# Patient Record
Sex: FEMALE | Race: BLACK OR AFRICAN AMERICAN | NOT HISPANIC OR LATINO | URBAN - METROPOLITAN AREA
[De-identification: names, ages, dates, MRNs, and addresses within clinical notes are randomized per-mention and may not be internally consistent; named-entity substitution may affect disease eponyms.]

---

## 2021-11-21 ENCOUNTER — EMERGENCY (EMERGENCY)
Facility: HOSPITAL | Age: 66
LOS: 1 days | Discharge: HOME | End: 2021-11-21
Admitting: EMERGENCY MEDICINE
Payer: MEDICARE

## 2021-11-21 VITALS
DIASTOLIC BLOOD PRESSURE: 84 MMHG | OXYGEN SATURATION: 96 % | RESPIRATION RATE: 18 BRPM | WEIGHT: 293 LBS | SYSTOLIC BLOOD PRESSURE: 150 MMHG | HEART RATE: 73 BPM | TEMPERATURE: 98 F | HEIGHT: 67 IN

## 2021-11-21 VITALS
OXYGEN SATURATION: 97 % | DIASTOLIC BLOOD PRESSURE: 81 MMHG | RESPIRATION RATE: 18 BRPM | SYSTOLIC BLOOD PRESSURE: 148 MMHG | HEART RATE: 76 BPM | TEMPERATURE: 98 F

## 2021-11-21 DIAGNOSIS — G89.29 OTHER CHRONIC PAIN: ICD-10-CM
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
## 2021-11-21 DIAGNOSIS — M54.50 LOW BACK PAIN, UNSPECIFIED: ICD-10-CM
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
## 2021-11-21 DIAGNOSIS — M25.562 PAIN IN LEFT KNEE: ICD-10-CM

## 2021-11-21 PROCEDURE — 99284 EMERGENCY DEPT VISIT MOD MDM: CPT

## 2021-11-21 RX ORDER — ACETAMINOPHEN 500 MG
975 TABLET ORAL ONCE
Refills: 0 | Status: COMPLETED | OUTPATIENT
Start: 2021-11-21 | End: 2021-11-21

## 2021-11-21 RX ADMIN — Medication 975 MILLIGRAM(S): at 16:52

## 2021-11-21 RX ADMIN — Medication 975 MILLIGRAM(S): at 15:52

## 2021-11-21 NOTE — ED PROVIDER NOTE - PATIENT PORTAL LINK FT
You can access the FollowMyHealth Patient Portal offered by [facility] by registering at the following website: [email or web address]. By joining [employer]’s FollowMyHealth portal, you will also be able to view your health information using other applications (apps) compatible with our system.

## 2021-11-21 NOTE — ED ADULT TRIAGE NOTE - CHIEF COMPLAINT QUOTE
Pt BIBA with c/o lower back pain. Pt with hx chronic back pain states she was out shopping and her back gave out. No numbness or loss of continence. Also c/o non traumatic Lt knee pain.

## 2021-11-21 NOTE — ED ADULT NURSE NOTE - NSFALLRSKPASTHIST_ED_ALL_ED
This is a recent snapshot of the patient's [facility] medical record.  For current drug dose and complete information and questions, call [phone] or In Basket pool, [facility]v home infusion ([id number])  CSN Number:  [id number]       no

## 2021-11-21 NOTE — ED PROVIDER NOTE - CARE PROVIDER_API CALL
Gertrudis Shukla)  [facility]  [address] [place] [address], [address]  [place] [address]  Phone: [phone]  Fax: [phone]  Follow Up Time:

## 2021-11-21 NOTE — ED PROVIDER NOTE - CLINICAL SUMMARY MEDICAL DECISION MAKING FREE TEXT BOX
Patient with chronic back pain and knee pain BIB EMS with worsening of pain after standing for prolonged period of time waiting for her access ride. given tylenol in ED. advised follow up with PCP and PM&R

## 2021-11-21 NOTE — ED BEHAVIORAL HEALTH NOTE - BEHAVIORAL HEALTH NOTE
SW consulted to the Ed by Provider to arrange transportation for PT.  PT is aware of being ineligible for transportation through medicaid.  [facility] at [phone] and arranged transportation for discharged.  P/U is scheduled for 7:35pm confirmation # [id number].  [facility] informed ED SW that they will call the  with the name of the cab services who will be picking PT up.  Team made aware and SW made available for further information.

## 2021-11-21 NOTE — ED PROVIDER NOTE - OBJECTIVE STATEMENT
PMHX back and L knee pain ambulates with cane presenst with back and knee pain. states that pain had been getting worse over the past few weeks. usually take topical diclofenac but has been taking ibuprofen for pain. states that she had gone shopping to day and been waiting for her accessor ride to return home. states that she had no where to sit and had stand for prolonged time which worsened her pain. states that she could not stand any longer to wait for her ride and called 911. denies fall or direct trauma

## 2022-03-04 NOTE — ED ADULT NURSE NOTE - NURSING NEURO ORIENTATION
Pt informed of result. We don't know if this is from ibuprofen or Crohn's disease. Redo in 5 yr.
oriented to person, place and time

## 2022-08-12 ENCOUNTER — EMERGENCY (EMERGENCY)
Facility: HOSPITAL | Age: 67
LOS: 1 days | Discharge: HOME | End: 2022-08-12
Attending: EMERGENCY MEDICINE | Admitting: EMERGENCY MEDICINE

## 2022-08-12 VITALS
OXYGEN SATURATION: 98 % | RESPIRATION RATE: 18 BRPM | HEART RATE: 80 BPM | DIASTOLIC BLOOD PRESSURE: 78 MMHG | SYSTOLIC BLOOD PRESSURE: 110 MMHG

## 2022-08-12 VITALS
HEIGHT: 67 IN | RESPIRATION RATE: 18 BRPM | HEART RATE: 77 BPM | SYSTOLIC BLOOD PRESSURE: 95 MMHG | OXYGEN SATURATION: 99 % | WEIGHT: 293 LBS | TEMPERATURE: 98 F | DIASTOLIC BLOOD PRESSURE: 67 MMHG

## 2022-08-12 DIAGNOSIS — E11.9 TYPE 2 DIABETES MELLITUS WITHOUT COMPLICATIONS: ICD-10-CM

## 2022-08-12 DIAGNOSIS — R20.0 ANESTHESIA OF SKIN: ICD-10-CM

## 2022-08-12 DIAGNOSIS — I10 ESSENTIAL (PRIMARY) HYPERTENSION: ICD-10-CM

## 2022-08-12 DIAGNOSIS — M54.42 LUMBAGO WITH SCIATICA, LEFT SIDE: ICD-10-CM

## 2022-08-12 DIAGNOSIS — M46.96 UNSPECIFIED INFLAMMATORY SPONDYLOPATHY, LUMBAR REGION: ICD-10-CM

## 2022-08-12 PROCEDURE — 99284 EMERGENCY DEPT VISIT MOD MDM: CPT

## 2022-08-12 RX ORDER — METHOCARBAMOL 500 MG/1
2 TABLET, FILM COATED ORAL
Qty: 20 | Refills: 0
Start: 2022-08-12 | End: 2022-08-16

## 2022-08-12 RX ORDER — METHOCARBAMOL 500 MG/1
750 TABLET, FILM COATED ORAL ONCE
Refills: 0 | Status: COMPLETED | OUTPATIENT
Start: 2022-08-12 | End: 2022-08-12

## 2022-08-12 RX ORDER — ACETAMINOPHEN 500 MG
975 TABLET ORAL ONCE
Refills: 0 | Status: COMPLETED | OUTPATIENT
Start: 2022-08-12 | End: 2022-08-12

## 2022-08-12 RX ADMIN — METHOCARBAMOL 750 MILLIGRAM(S): 500 TABLET, FILM COATED ORAL at 14:30

## 2022-08-12 RX ADMIN — Medication 975 MILLIGRAM(S): at 14:30

## 2022-08-12 NOTE — ED ADULT TRIAGE NOTE - CHIEF COMPLAINT QUOTE
pt. with no specific medical complaints. As per pt. she was waiting for an access a ride for over 2.5 hours and during that time her feet and back started to bother her from sitting outside for too long.

## 2022-08-12 NOTE — ED PROVIDER NOTE - PATIENT PORTAL LINK FT
You can access the FollowMyHealth Patient Portal offered by [facility] by registering at the following website: [email or web address]. By joining [employer]’s FollowMyHealth portal, you will also be able to view your health information using other applications (apps) compatible with our system.

## 2022-08-12 NOTE — ED PROVIDER NOTE - NEUROLOGICAL, MLM
Alert, oriented. Speech is fluent and appropriate. Moving all extremities appropriately. No gross motor or sensory abnormalities.

## 2022-08-12 NOTE — ED PROVIDER NOTE - PROGRESS NOTE DETAILS
Patient is feeling much better. She tolerated the muscle relaxer well. Will send her home with a prescription for a muscle relaxer.

## 2022-08-12 NOTE — ED PROVIDER NOTE - MUSCULOSKELETAL, MLM
Patient looks uncomfortable with movement. Patient reports subjective numbness to lateral aspect of her left leg. No midline spine tenderness but she had some left sided paraspinal soreness to palpation.

## 2022-08-12 NOTE — ED PROVIDER NOTE - NSFOLLOWUPINSTRUCTIONS_ED_ALL_ED_FT
Back Pain with sciatica    Please take your usual pain medications and the muscle relaxants as needed for pain and back spasm.     Back pain is very common in adults. The cause of back pain is rarely dangerous and the pain often gets better over time. The cause of your back pain may not be known and may include strain of muscles or ligaments, degeneration of the spinal disks, or arthritis. Occasionally the pain may radiate down your leg(s). Over-the-counter medicines to reduce pain and inflammation are often the most helpful. Stretching and remaining active frequently helps the healing process.     SEEK IMMEDIATE MEDICAL CARE IF YOU HAVE ANY OF THE FOLLOWING SYMPTOMS: bowel or bladder control problems, unusual weakness or numbness in your arms or legs, nausea or vomiting, abdominal pain, fever, dizziness/lightheadedness.

## 2022-08-12 NOTE — ED PROVIDER NOTE - OBJECTIVE STATEMENT
68 y/o F with PMH of HTN, DM, and Arthritis of her lower back for which uses Diclofenac 1% gel, who presents today complaining of leg numbness and back pain/discomfort after she was unable to get an Access-A-Ride and had to wait outside for over 2 hours. She reports there was no place to sit as she waited for the Access-A-Ride and so she had to lean on a horizontal metal bar. After 2 hours her legs began to feel numb and she had to be helped to sit down on steps nearby. She was unable to get up so she decided to call EMS and come to the ER. She is now complaining of numbness down the lateral side of her left leg and some lower back discomfort in the ED. She states she can only take Tylenol and uses Diclofenac 1% gel for the pain and can not take ibuprofen. She used her Diclofenac gel today.

## 2024-01-05 NOTE — ED PROVIDER NOTE - CLINICAL SUMMARY MEDICAL DECISION MAKING FREE TEXT BOX
Patient presents with exacerbation of underlying low back pain with either sciatica like symptoms or compressive neuropathy from leaning on a horizontal metal bar for close to 2 hours. Will give Tylenol and methocarbamol as she states she can not tolerate NSAIDs unless they are topical.
no

## 2024-10-18 NOTE — ED PROVIDER NOTE - CHPI ED SYMPTOMS POS
numbness, discomfort/PAIN Patient requests all Lab, Cardiology, and Radiology Results on their Discharge Instructions